# Patient Record
Sex: MALE | Race: WHITE | NOT HISPANIC OR LATINO | Employment: FULL TIME | URBAN - NONMETROPOLITAN AREA
[De-identification: names, ages, dates, MRNs, and addresses within clinical notes are randomized per-mention and may not be internally consistent; named-entity substitution may affect disease eponyms.]

---

## 2018-03-13 ENCOUNTER — OFFICE VISIT (OUTPATIENT)
Dept: INTERNAL MEDICINE | Facility: CLINIC | Age: 23
End: 2018-03-13

## 2018-03-13 VITALS
DIASTOLIC BLOOD PRESSURE: 70 MMHG | BODY MASS INDEX: 26.63 KG/M2 | TEMPERATURE: 97.4 F | HEIGHT: 71 IN | RESPIRATION RATE: 12 BRPM | HEART RATE: 94 BPM | SYSTOLIC BLOOD PRESSURE: 112 MMHG | WEIGHT: 190.2 LBS | OXYGEN SATURATION: 98 %

## 2018-03-13 DIAGNOSIS — R12 HEARTBURN: ICD-10-CM

## 2018-03-13 DIAGNOSIS — R19.7 WATERY DIARRHEA: Primary | ICD-10-CM

## 2018-03-13 PROCEDURE — 99213 OFFICE O/P EST LOW 20 MIN: CPT | Performed by: FAMILY MEDICINE

## 2018-03-13 RX ORDER — LOPERAMIDE HYDROCHLORIDE 2 MG/1
2 CAPSULE ORAL 4 TIMES DAILY PRN
COMMUNITY
End: 2019-03-12

## 2018-03-13 RX ORDER — RANITIDINE 150 MG/1
150 CAPSULE ORAL 2 TIMES DAILY PRN
Qty: 60 CAPSULE | Refills: 1 | Status: SHIPPED | OUTPATIENT
Start: 2018-03-13 | End: 2019-03-12

## 2018-03-13 NOTE — PROGRESS NOTES
"C/O nausea, decrease appetite, diarrhea, heartburn past 5 days.     SUBJECTIVE: Prabhjot Peterson is a 22 y.o. male seen for a follow up visit;    Gastroenteritis  Patient complains of cramping pain located in in the LLQ, diarrhea several times per day, heartburn and nausea for several days.  Patient denies fever. Patient's oral intake has been increased for liquids.  Patient's urine output has been adequate. Other contacts with similar symptoms include: no one. Patient denies recent travel history. Patient has not had recent ingestion of possible contaminated food, toxic plants, or inappropriate medications/poisons.      Started w/ LLQ abdominal pain last week, w/ diarrhea a few days later - bad for several days.  Imodium helped but still getting a lot of cramping and abdominal pain.  Diarrhea has been very watery, loose.  No one else in family       The following portions of the patient's history were reviewed and updated as appropriate: He  has no past medical history on file.  He  does not have a problem list on file.  He  has no past surgical history on file.  His family history includes Bipolar disorder in his father and mother; Breast cancer in his paternal grandmother; Diabetes in his brother and father; Heart disease in his maternal grandmother; Thyroid cancer in his mother.  He  reports that he has never smoked. He has never used smokeless tobacco. He reports that he drinks alcohol. He reports that he does not use drugs.  He has a current medication list which includes the following prescription(s): loperamide and ranitidine..    Review of Systems   Constitutional: Negative for chills and fever.   Respiratory: Negative.    Cardiovascular: Negative.    Gastrointestinal: Positive for abdominal pain, diarrhea and nausea. Negative for anal bleeding, blood in stool and vomiting.   Skin: Negative.          OBJECTIVE:  /70   Pulse 94   Temp 97.4 °F (36.3 °C) (Temporal Artery )   Resp 12   Ht 180.3 cm (71\")  "  Wt 86.3 kg (190 lb 3.2 oz)   SpO2 98%   BMI 26.53 kg/m²      Physical Exam   Constitutional: He appears well-developed and well-nourished. No distress.   Abdominal: Soft. There is tenderness in the left lower quadrant. There is no rigidity, no rebound, no guarding and no CVA tenderness.           ASSESSMENT:   Diagnosis Plan   1. Watery diarrhea  Cryptosporidium Antigen, Stool - Stool, Per Rectum    Fecal Leukocytes - Stool, Per Rectum    Shiga-like Toxin Antigen, EIA - Stool, Per Rectum    Campylobacter Culture, Stool - Stool, Per Rectum    Helicobacter Pylori, IgA IgG IgM    Clostridium Difficile EIA - Stool, Per Rectum    Stool Culture With Yersinia - Stool, Per Rectum    Comprehensive Metabolic Panel   2. Heartburn  Comprehensive Metabolic Panel    ranitidine (ZANTAC) 150 MG capsule                 I, Lynsey Solano MD, hereby attest that the medical record entry above accurately reflects signatures/notations that I made in my capacity as Lynsey Solano MD when I treated and diagnosed the above patient.  I do hereby attest that his information is true, accurate, and complete to the best of my knowledge and I understand that any falsification, omission, or concealment of material fact may subject me to administrative, civil, or criminal liability.

## 2019-03-12 ENCOUNTER — OFFICE VISIT (OUTPATIENT)
Dept: INTERNAL MEDICINE | Facility: CLINIC | Age: 24
End: 2019-03-12

## 2019-03-12 VITALS
SYSTOLIC BLOOD PRESSURE: 136 MMHG | HEART RATE: 102 BPM | HEIGHT: 71 IN | OXYGEN SATURATION: 98 % | WEIGHT: 188 LBS | BODY MASS INDEX: 26.32 KG/M2 | TEMPERATURE: 101 F | DIASTOLIC BLOOD PRESSURE: 72 MMHG

## 2019-03-12 DIAGNOSIS — J02.0 STREP PHARYNGITIS: Primary | ICD-10-CM

## 2019-03-12 DIAGNOSIS — R50.9 FEVER, UNSPECIFIED FEVER CAUSE: ICD-10-CM

## 2019-03-12 LAB
EXPIRATION DATE: ABNORMAL
EXPIRATION DATE: NORMAL
FLUAV RNA RESP QL NAA+PROBE: NEGATIVE
FLUBV RNA RESP QL NAA+PROBE: NEGATIVE
INTERNAL CONTROL: ABNORMAL
INTERNAL CONTROL: NORMAL
Lab: ABNORMAL
Lab: NORMAL
S PYO AG THROAT QL: POSITIVE

## 2019-03-12 PROCEDURE — 87502 INFLUENZA DNA AMP PROBE: CPT | Performed by: PHYSICIAN ASSISTANT

## 2019-03-12 PROCEDURE — 99213 OFFICE O/P EST LOW 20 MIN: CPT | Performed by: PHYSICIAN ASSISTANT

## 2019-03-12 PROCEDURE — 87880 STREP A ASSAY W/OPTIC: CPT | Performed by: PHYSICIAN ASSISTANT

## 2019-03-12 RX ORDER — AMOXICILLIN 875 MG/1
875 TABLET, COATED ORAL 2 TIMES DAILY
Qty: 20 TABLET | Refills: 0 | Status: SHIPPED | OUTPATIENT
Start: 2019-03-12 | End: 2019-03-22

## 2019-03-12 NOTE — PROGRESS NOTES
Chief Complaint   Patient presents with   • Sore Throat     body aches, fever, cough, ear drainage       Subjective   Prabhjot Peterson is a 23 y.o. male    History of Present Illness     Symptoms began Saturday with ear pressure.  This continued over Sunday and Monday.  Some mild associated post nasal drainage and cough.  He reports that this morning he began having body aches, sore throat, and chills.  He looked into his throat and his tonsils were swollen and he had white exudate to the back of his throat.  He has also had fever today.  He has taken Motrin, which did help relieve some of the sore throat.      History reviewed. No pertinent past medical history.  History reviewed. No pertinent surgical history.  Family History   Problem Relation Age of Onset   • Thyroid cancer Mother    • Bipolar disorder Mother    • Diabetes Father         type 1   • Bipolar disorder Father    • Diabetes Brother         type 1   • Heart disease Maternal Grandmother         CABG   • Breast cancer Paternal Grandmother      Social History     Socioeconomic History   • Marital status:      Spouse name: Not on file   • Number of children: Not on file   • Years of education: Not on file   • Highest education level: Not on file   Social Needs   • Financial resource strain: Not on file   • Food insecurity - worry: Not on file   • Food insecurity - inability: Not on file   • Transportation needs - medical: Not on file   • Transportation needs - non-medical: Not on file   Occupational History   • Not on file   Tobacco Use   • Smoking status: Never Smoker   • Smokeless tobacco: Never Used   Substance and Sexual Activity   • Alcohol use: Yes     Comment: Occasional 1 x month   • Drug use: No   • Sexual activity: Yes     Partners: Female   Other Topics Concern   • Not on file   Social History Narrative   • Not on file     No Known Allergies      Review of Systems   Constitutional: Positive for appetite change, chills, fatigue and fever.  Negative for activity change.   HENT: Positive for congestion, postnasal drip, sore throat and trouble swallowing. Negative for voice change.    Respiratory: Positive for cough. Negative for apnea and shortness of breath.    Cardiovascular: Negative for chest pain.   Musculoskeletal: Positive for arthralgias.   Skin: Negative.    Neurological: Positive for headache. Negative for dizziness and light-headedness.     Objective     Vitals:    03/12/19 1737   BP: 136/72   Pulse: 102   Temp: (!) 101 °F (38.3 °C)   SpO2: 98%       Physical Exam   Constitutional: He is oriented to person, place, and time. He appears well-developed and well-nourished. No distress.   HENT:   Head: Normocephalic and atraumatic.   Right Ear: Tympanic membrane, external ear and ear canal normal.   Left Ear: Tympanic membrane, external ear and ear canal normal.   Nose: Congestion present.   Mouth/Throat: Uvula is midline and mucous membranes are normal. Oropharyngeal exudate, posterior oropharyngeal edema and posterior oropharyngeal erythema present.   Eyes: Conjunctivae and EOM are normal. Pupils are equal, round, and reactive to light.   Neck: Normal range of motion. Neck supple.   Cardiovascular: Regular rhythm and normal heart sounds. Tachycardia present. Exam reveals no gallop and no friction rub.   No murmur heard.  Pulmonary/Chest: Effort normal and breath sounds normal. No respiratory distress. He has no wheezes. He has no rales.   Musculoskeletal: Normal range of motion. He exhibits no edema.   Lymphadenopathy:     He has cervical adenopathy.   Neurological: He is alert and oriented to person, place, and time.   Skin: Skin is warm and dry. Capillary refill takes less than 2 seconds. He is not diaphoretic.   Psychiatric: He has a normal mood and affect. His behavior is normal.   Nursing note and vitals reviewed.      Results for orders placed or performed in visit on 03/12/19   POCT Flu A&B, Molecular   Result Value Ref Range    POC  Influenza A, Molecular Negative Negative    POC Influenza B, Molecular Negative Negative    Internal Control Passed Passed    Lot Number [id number]     Expiration Date 04/30/2020    POC Rapid Strep A   Result Value Ref Range    Rapid Strep A Screen Positive (A) Negative, VALID, INVALID, Not Performed    Internal Control Passed Passed    Lot Number [id number]     Expiration Date 03/31/2020        Assessment/Plan     Prabhjot was seen today for sore throat.    Diagnoses and all orders for this visit:    Strep pharyngitis  -     Strep positive.  Start amoxicillin.  Use salt water gargles, sick warm fluids, throat lozenges for symptom control.  Return to clinic if symptoms are not improving or if they worsen.  Patient verbalizes understanding and is in agreement with plan.  -     amoxicillin (AMOXIL) 875 MG tablet; Take 1 tablet by mouth 2 (Two) Times a Day for 10 days.    Fever, unspecified fever cause  -     Strep positive, flu negative.  -     POCT Flu A&B, Molecular  -     POC Rapid Strep A        Return if symptoms worsen or fail to improve.    Ludmila Lowery PA-C

## 2019-03-20 ENCOUNTER — TELEPHONE (OUTPATIENT)
Dept: INTERNAL MEDICINE | Facility: CLINIC | Age: 24
End: 2019-03-20

## 2019-03-20 NOTE — TELEPHONE ENCOUNTER
Spoke with patient he has a few more days of amoxicillin left.  Continue amoxicillin use ibuprofen and salt water gargles.  He reports that a few days ago his symptoms had completely resolved but today had restarted with one side of his throat hurting.  Advised patient to call back Friday after amoxicillin was completed if symptoms are still persistent.  He agreed to plan.

## 2019-03-20 NOTE — TELEPHONE ENCOUNTER
Patient says that he still feels like he has strep wanting to know if there is anything else you can give him?

## 2019-03-22 RX ORDER — CEFDINIR 300 MG/1
300 CAPSULE ORAL 2 TIMES DAILY
Qty: 20 CAPSULE | Refills: 0 | Status: SHIPPED | OUTPATIENT
Start: 2019-03-22 | End: 2019-04-01

## 2019-03-22 NOTE — TELEPHONE ENCOUNTER
Pt called today stating he has only one pill left of amoxicillin and he has a fever the last two days and he feels like the strep has flared back up and moved from right tonsil to the left.

## 2019-03-27 ENCOUNTER — TELEPHONE (OUTPATIENT)
Dept: INTERNAL MEDICINE | Facility: CLINIC | Age: 24
End: 2019-03-27

## 2019-06-19 ENCOUNTER — OFFICE VISIT (OUTPATIENT)
Dept: INTERNAL MEDICINE | Facility: CLINIC | Age: 24
End: 2019-06-19

## 2019-06-19 VITALS
RESPIRATION RATE: 15 BRPM | BODY MASS INDEX: 25.9 KG/M2 | OXYGEN SATURATION: 99 % | DIASTOLIC BLOOD PRESSURE: 71 MMHG | SYSTOLIC BLOOD PRESSURE: 121 MMHG | HEART RATE: 93 BPM | HEIGHT: 71 IN | TEMPERATURE: 98.5 F | WEIGHT: 185 LBS

## 2019-06-19 DIAGNOSIS — R50.9 FEVER, UNSPECIFIED FEVER CAUSE: Primary | ICD-10-CM

## 2019-06-19 DIAGNOSIS — J02.0 STREP PHARYNGITIS: ICD-10-CM

## 2019-06-19 LAB
EXPIRATION DATE: ABNORMAL
INTERNAL CONTROL: ABNORMAL
Lab: ABNORMAL
S PYO RRNA THROAT QL PROBE: POSITIVE

## 2019-06-19 PROCEDURE — 87651 STREP A DNA AMP PROBE: CPT | Performed by: NURSE PRACTITIONER

## 2019-06-19 PROCEDURE — 99213 OFFICE O/P EST LOW 20 MIN: CPT | Performed by: NURSE PRACTITIONER

## 2019-06-19 PROCEDURE — 96372 THER/PROPH/DIAG INJ SC/IM: CPT | Performed by: NURSE PRACTITIONER

## 2019-06-19 NOTE — PROGRESS NOTES
Chief Complaint / Reason:      Chief Complaint   Patient presents with   • Sore Throat   • Sinusitis   • Fever       Subjective     HPI  Present Today with complaint of sore throat sinus pressure and fever.  He denies chest pain, shortness of breath or heart palpitations vital signs are stable.  He states symptoms have been going on for several days and has progressively worsened.  Patient has had strep in the past and states he feels like he could have strep again.  He has been getting child and does not want to make his wife or child sick if he does have strep.  He states he does not bite on his nails but does put his fingers in his mouth.  He states it is almost like oral fixation and he has been he was young.  History taken from: patient    PMH/FH/Social History were reviewed and updated appropriately in the electronic medical record.   History reviewed. No pertinent past medical history.  History reviewed. No pertinent surgical history.  Social History     Socioeconomic History   • Marital status:      Spouse name: Not on file   • Number of children: Not on file   • Years of education: Not on file   • Highest education level: Not on file   Tobacco Use   • Smoking status: Never Smoker   • Smokeless tobacco: Never Used   Substance and Sexual Activity   • Alcohol use: Yes     Comment: Occasional 1 x month   • Drug use: No   • Sexual activity: Yes     Partners: Female     Family History   Problem Relation Age of Onset   • Thyroid cancer Mother    • Bipolar disorder Mother    • Diabetes Father         type 1   • Bipolar disorder Father    • Diabetes Brother         type 1   • Heart disease Maternal Grandmother         CABG   • Breast cancer Paternal Grandmother        Review of Systems:   Review of Systems   Constitutional: Positive for fatigue and fever.   HENT: Positive for congestion, sore throat and trouble swallowing. Negative for voice change.    Respiratory: Negative.  Negative for apnea and  shortness of breath.    Cardiovascular: Negative.    Gastrointestinal: Negative.    Skin: Negative.    Neurological: Positive for headache.         All other systems were reviewed and are negative.  Exceptions are noted in the subjective or above.      Objective     Vital Signs  Vitals:    06/19/19 1147   BP: 121/71   Pulse: 93   Resp: 15   Temp: 98.5 °F (36.9 °C)   SpO2: 99%       Body mass index is 25.8 kg/m².    Physical Exam   Constitutional: He appears well-developed and well-nourished. No distress.   HENT:   Right Ear: External ear and ear canal normal. Tympanic membrane is erythematous and bulging.   Left Ear: External ear and ear canal normal. Tympanic membrane is erythematous and bulging.   Nose: Mucosal edema and rhinorrhea present. Right sinus exhibits no maxillary sinus tenderness and no frontal sinus tenderness. Left sinus exhibits no maxillary sinus tenderness and no frontal sinus tenderness.   Mouth/Throat: Mucous membranes are dry. Posterior oropharyngeal edema and posterior oropharyngeal erythema present. No oropharyngeal exudate or tonsillar abscesses. No tonsillar exudate.   Eyes: Conjunctivae are normal.   Cardiovascular: Normal rate, regular rhythm and normal heart sounds.   Pulmonary/Chest: Effort normal and breath sounds normal.   Abdominal: Soft. There is no hepatosplenomegaly. There is no tenderness.   Lymphadenopathy:        Head (right side): No submental, no submandibular and no tonsillar adenopathy present.        Head (left side): No submental, no submandibular and no tonsillar adenopathy present.     He has no cervical adenopathy.   Neurological: He is alert.   Skin: He is not diaphoretic.   Nursing note and vitals reviewed.           Results Review:    I reviewed the patient's new clinical results.   Office Visit on 06/19/2019   Component Date Value Ref Range Status   • POC Strep A, Molecular 06/19/2019 Positive* Negative Final   • Internal Control 06/19/2019 Passed  Passed Final   •  Lot Number 06/19/2019 [id number]   Final   • Expiration Date 06/19/2019 03/31/2020   Final         Medication Review:   No current outpatient medications on file.    Assessment/Plan   Prabhjot was seen today for sore throat, sinusitis and fever.    Diagnoses and all orders for this visit:    Fever, unspecified fever cause  -     POCT Strep A, molecular    Strep pharyngitis  -     penicillin G benzathine (BICILLIN-LA) injection 1.2 Million Units    Strep prevention discussed recommend patient take Tylenol or motrin  And discussed oral hygiene advised patient to avoid putting hand-foot-and-mouth.  Recommend patient change out toothbrush and contact office if symptoms worsen.  Return if symptoms worsen or fail to improve.    Lakeisha Toussaint, APRN  06/19/2019

## 2020-07-16 PROCEDURE — U0003 INFECTIOUS AGENT DETECTION BY NUCLEIC ACID (DNA OR RNA); SEVERE ACUTE RESPIRATORY SYNDROME CORONAVIRUS 2 (SARS-COV-2) (CORONAVIRUS DISEASE [COVID-19]), AMPLIFIED PROBE TECHNIQUE, MAKING USE OF HIGH THROUGHPUT TECHNOLOGIES AS DESCRIBED BY CMS-2020-01-R: HCPCS | Performed by: FAMILY MEDICINE

## 2020-07-18 ENCOUNTER — TELEPHONE (OUTPATIENT)
Dept: URGENT CARE | Facility: CLINIC | Age: 25
End: 2020-07-18

## 2020-07-24 ENCOUNTER — OFFICE VISIT (OUTPATIENT)
Dept: INTERNAL MEDICINE | Facility: CLINIC | Age: 25
End: 2020-07-24

## 2020-07-24 VITALS
RESPIRATION RATE: 15 BRPM | BODY MASS INDEX: 27.16 KG/M2 | HEART RATE: 90 BPM | WEIGHT: 194 LBS | HEIGHT: 71 IN | TEMPERATURE: 98.1 F | DIASTOLIC BLOOD PRESSURE: 66 MMHG | SYSTOLIC BLOOD PRESSURE: 124 MMHG | OXYGEN SATURATION: 99 %

## 2020-07-24 DIAGNOSIS — Z13.21 SCREENING FOR ENDOCRINE, NUTRITIONAL, METABOLIC AND IMMUNITY DISORDER: ICD-10-CM

## 2020-07-24 DIAGNOSIS — Z00.00 PHYSICAL EXAM, ANNUAL: Primary | ICD-10-CM

## 2020-07-24 DIAGNOSIS — Z13.29 SCREENING FOR ENDOCRINE, NUTRITIONAL, METABOLIC AND IMMUNITY DISORDER: ICD-10-CM

## 2020-07-24 DIAGNOSIS — Z13.228 SCREENING FOR ENDOCRINE, NUTRITIONAL, METABOLIC AND IMMUNITY DISORDER: ICD-10-CM

## 2020-07-24 DIAGNOSIS — Z83.3 FAMILY HISTORY OF DIABETES MELLITUS (DM): ICD-10-CM

## 2020-07-24 DIAGNOSIS — K62.5 BRIGHT RED RECTAL BLEEDING: ICD-10-CM

## 2020-07-24 DIAGNOSIS — Z13.0 SCREENING FOR ENDOCRINE, NUTRITIONAL, METABOLIC AND IMMUNITY DISORDER: ICD-10-CM

## 2020-07-24 DIAGNOSIS — K64.9 HEMORRHOIDS, UNSPECIFIED HEMORRHOID TYPE: ICD-10-CM

## 2020-07-24 LAB
ALBUMIN SERPL-MCNC: 5.1 G/DL (ref 3.5–5.2)
ALBUMIN/GLOB SERPL: 2.3 G/DL
ALP SERPL-CCNC: 83 U/L (ref 39–117)
ALT SERPL-CCNC: 19 U/L (ref 1–41)
AST SERPL-CCNC: 16 U/L (ref 1–40)
BASOPHILS # BLD AUTO: 0.05 10*3/MM3 (ref 0–0.2)
BASOPHILS NFR BLD AUTO: 0.9 % (ref 0–1.5)
BILIRUB SERPL-MCNC: 1.2 MG/DL (ref 0–1.2)
BUN SERPL-MCNC: 11 MG/DL (ref 6–20)
BUN/CREAT SERPL: 10.6 (ref 7–25)
CALCIUM SERPL-MCNC: 10.2 MG/DL (ref 8.6–10.5)
CHLORIDE SERPL-SCNC: 101 MMOL/L (ref 98–107)
CHOLEST SERPL-MCNC: 214 MG/DL (ref 0–200)
CO2 SERPL-SCNC: 29.1 MMOL/L (ref 22–29)
CREAT SERPL-MCNC: 1.04 MG/DL (ref 0.76–1.27)
EOSINOPHIL # BLD AUTO: 0.08 10*3/MM3 (ref 0–0.4)
EOSINOPHIL NFR BLD AUTO: 1.4 % (ref 0.3–6.2)
ERYTHROCYTE [DISTWIDTH] IN BLOOD BY AUTOMATED COUNT: 12.1 % (ref 12.3–15.4)
FERRITIN SERPL-MCNC: 96.8 NG/ML (ref 30–400)
GLOBULIN SER CALC-MCNC: 2.2 GM/DL
GLUCOSE SERPL-MCNC: 96 MG/DL (ref 65–99)
HBA1C MFR BLD: 4.9 % (ref 4.8–5.6)
HCT VFR BLD AUTO: 44.7 % (ref 37.5–51)
HDLC SERPL-MCNC: 49 MG/DL (ref 40–60)
HGB BLD-MCNC: 15.6 G/DL (ref 13–17.7)
IMM GRANULOCYTES # BLD AUTO: 0.03 10*3/MM3 (ref 0–0.05)
IMM GRANULOCYTES NFR BLD AUTO: 0.5 % (ref 0–0.5)
IRON SATN MFR SERPL: 33 % (ref 20–50)
IRON SERPL-MCNC: 128 MCG/DL (ref 59–158)
LDLC SERPL CALC-MCNC: 148 MG/DL (ref 0–100)
LYMPHOCYTES # BLD AUTO: 1.24 10*3/MM3 (ref 0.7–3.1)
LYMPHOCYTES NFR BLD AUTO: 21.4 % (ref 19.6–45.3)
MCH RBC QN AUTO: 28.7 PG (ref 26.6–33)
MCHC RBC AUTO-ENTMCNC: 34.9 G/DL (ref 31.5–35.7)
MCV RBC AUTO: 82.2 FL (ref 79–97)
MONOCYTES # BLD AUTO: 0.46 10*3/MM3 (ref 0.1–0.9)
MONOCYTES NFR BLD AUTO: 7.9 % (ref 5–12)
NEUTROPHILS # BLD AUTO: 3.94 10*3/MM3 (ref 1.7–7)
NEUTROPHILS NFR BLD AUTO: 67.9 % (ref 42.7–76)
NRBC BLD AUTO-RTO: 0 /100 WBC (ref 0–0.2)
PLATELET # BLD AUTO: 274 10*3/MM3 (ref 140–450)
POTASSIUM SERPL-SCNC: 4.1 MMOL/L (ref 3.5–5.2)
PROT SERPL-MCNC: 7.3 G/DL (ref 6–8.5)
RBC # BLD AUTO: 5.44 10*6/MM3 (ref 4.14–5.8)
SODIUM SERPL-SCNC: 140 MMOL/L (ref 136–145)
T4 FREE SERPL-MCNC: 1.34 NG/DL (ref 0.93–1.7)
TIBC SERPL-MCNC: 388 MCG/DL
TRIGL SERPL-MCNC: 85 MG/DL (ref 0–150)
TSH SERPL DL<=0.005 MIU/L-ACNC: 1.27 UIU/ML (ref 0.27–4.2)
UIBC SERPL-MCNC: 260 MCG/DL (ref 112–346)
VLDLC SERPL CALC-MCNC: 17 MG/DL
WBC # BLD AUTO: 5.8 10*3/MM3 (ref 3.4–10.8)

## 2020-07-24 PROCEDURE — 99395 PREV VISIT EST AGE 18-39: CPT | Performed by: NURSE PRACTITIONER

## 2020-07-24 NOTE — PROGRESS NOTES
Subjective   Prabhjot Peterson is a 24 y.o. male and is here for a comprehensive physical exam. The patient reports problems - hemmoroids occasional bleeding .  Has tried over-the-counter medications with minimal relief.  Patient states that the blood is bright red and he has had it for some time he states that he does have a sedentary lifestyle as he did change jobs and has been doing a [employer] job in [place].  He denies fever chills weight loss or family history of colon cancer.  Patient does have a family history of diabetes.    Do you take any herbs or supplements that were not prescribed by a doctor?yes melatonin  Are you taking calcium supplements? No  Are you taking aspirin daily? No  Exercise:5 walking   Vision UTD:Yes  Dental UTD:Yes   History:  Patient receives prostate care here: N/A  He reports No decrease in urinary stream,  No nocturia, No dribbling, No hesitancy.    STDs:No  Sexually active at age:17  Abuse:No  Checks testicles:Yes   and has 1 child   The following portions of the patient's history were reviewed and updated as appropriate: allergies, current medications, past family history, past medical history, past social history, past surgical history and problem list.    Review of Systems  Do you have pain that bothers you in your daily life? no  Review of Systems   Constitutional: Negative for chills, fatigue, fever, unexpected weight gain and unexpected weight loss.   HENT: Negative for congestion, ear pain, hearing loss, rhinorrhea, sinus pressure, sneezing, sore throat and trouble swallowing.    Eyes: Negative for discharge and itching.   Respiratory: Negative for cough, chest tightness and shortness of breath.    Cardiovascular: Negative for chest pain, palpitations and leg swelling.   Gastrointestinal: Positive for anal bleeding. Negative for abdominal pain, blood in stool, constipation, diarrhea and vomiting.   Endocrine: Negative for polydipsia and polyuria.   Genitourinary:  "Negative for difficulty urinating, dysuria, frequency, hematuria, urgency and urinary incontinence.   Musculoskeletal: Negative for arthralgias, back pain, gait problem and joint swelling.   Skin: Negative for rash and bruise.   Allergic/Immunologic: Positive for environmental allergies. Negative for immunocompromised state.   Neurological: Negative for dizziness, syncope, weakness, light-headedness, numbness and headache.   Hematological: Does not bruise/bleed easily.   Psychiatric/Behavioral: Negative for behavioral problems, dysphoric mood and sleep disturbance. The patient is not nervous/anxious.          Objective   /66   Pulse 90   Temp 98.1 °F (36.7 °C)   Resp 15   Ht 180.3 cm (71\")   Wt 88 kg (194 lb)   SpO2 99%   BMI 27.06 kg/m²     Physical Exam   Constitutional: He is oriented to person, place, and time. He appears well-developed and well-nourished.   HENT:   Head: Normocephalic and atraumatic.   Right Ear: External ear and ear canal normal. Tympanic membrane is erythematous and bulging. No decreased hearing is noted.   Left Ear: External ear and ear canal normal. Tympanic membrane is bulging. No decreased hearing is noted.   Nose: Mucosal edema present.   Mouth/Throat: Oropharynx is clear and moist and mucous membranes are normal. Normal dentition.   Eyes: Pupils are equal, round, and reactive to light. Conjunctivae, EOM and lids are normal.   Neck: Trachea normal, normal range of motion and full passive range of motion without pain. Neck supple. No JVD present. Carotid bruit is not present. Thyroid mass present. No thyromegaly present.       Cardiovascular: Normal rate, regular rhythm, S1 normal, S2 normal, normal heart sounds and intact distal pulses.   No murmur heard.  Pulmonary/Chest: Effort normal and breath sounds normal.   Abdominal: Soft. Bowel sounds are normal. He exhibits no abdominal bruit and no mass. There is no hepatosplenomegaly. There is no tenderness.   Genitourinary: "   Genitourinary Comments: Deferred and refused rectal exam    Musculoskeletal: Normal range of motion. He exhibits no edema.   Lymphadenopathy:     He has no cervical adenopathy.        Right: No inguinal and no supraclavicular adenopathy present.        Left: No inguinal and no supraclavicular adenopathy present.   Neurological: He is alert and oriented to person, place, and time. He has normal strength. He displays normal reflexes. No cranial nerve deficit or sensory deficit. Gait normal.   Skin: Skin is warm and dry. Capillary refill takes less than 2 seconds. No rash noted. No cyanosis. Nails show no clubbing.   Psychiatric: He has a normal mood and affect. His speech is normal and behavior is normal. Judgment and thought content normal.   Nursing note and vitals reviewed.        Assessment/Plan   Healthy male exam.      1.Prabhjot was seen today for annual exam.    Diagnoses and all orders for this visit:    Physical exam, annual  -     Comprehensive metabolic panel  -     Lipid panel  -     CBC w AUTO Differential    Screening for endocrine, nutritional, metabolic and immunity disorder  -     TSH  -     T4, free  -     Iron and TIBC  -     Ferritin  -     Hemoglobin A1c    Family history of diabetes mellitus (DM)  -     Hemoglobin A1c    Bright red rectal bleeding  -     CBC w AUTO Differential    Hemorrhoids, unspecified hemorrhoid type  -     Hydrocort-Pramoxine, Perianal, (PROCTOFOAM-HS) 1-1 % rectal foam; Insert 1 applicator into the rectum 2 (Two) Times a Day.    Recommend increasing fiber in diet along with hydration nutrition.  Advised patient to avoid straining.  Discussed [place] stool chart and recommend GI referral if symptoms worsen    2. Patient Counseling:  --Nutrition: Stressed importance of moderation in sodium/caffeine intake, saturated fat and cholesterol, caloric balance, sufficient intake of fresh fruits, vegetables, fiber, calcium, iron,   --Discussed the issue of calcium supplement, and the  daily use of baby aspirin if applicable.  --Exercise: Stressed the importance of regular exercise.   --Substance Abuse: Discussed cessation/primary prevention of tobacco (if applicable, alcohol, or other drug use (if applicable); driving or other dangerous activities under the influence; availability of treatment for abuse.    --Sexuality: Discussed sexually transmitted diseases, partner selection, use of condoms, avoidance of unintended pregnancy  and contraceptive alternatives.   --Injury prevention: Discussed safety belts, safety helmets, smoke detector, smoking near bedding or upholstery.   --Dental health: Discussed importance of regular tooth brushing, flossing, and dental visits.  --Immunizations reviewed.  --Discussed benefits of screening vision/dental   --After hours service discussed with patient    3. Discussed the patient's BMI with him.  The BMI is in the acceptable range  4. Follow up in one year and prsonia Toussaint, APRN 07/24/2020

## 2020-07-27 ENCOUNTER — TELEPHONE (OUTPATIENT)
Dept: INTERNAL MEDICINE | Facility: CLINIC | Age: 25
End: 2020-07-27

## 2020-07-27 NOTE — TELEPHONE ENCOUNTER
PT CALLED STATED THAT HIS TEST RESULTS ARE IN AND WOULD LIKE LIKE SOMEONE TO REACH OUT TO HIM TO DISCUSS RESULTS.    PLEASE ADVISE.  CALL BACK:[phone]

## 2022-05-09 ENCOUNTER — OFFICE VISIT (OUTPATIENT)
Dept: UROLOGY | Facility: CLINIC | Age: 27
End: 2022-05-09

## 2022-05-09 VITALS
BODY MASS INDEX: 27.16 KG/M2 | WEIGHT: 194 LBS | SYSTOLIC BLOOD PRESSURE: 126 MMHG | TEMPERATURE: 97.8 F | OXYGEN SATURATION: 99 % | DIASTOLIC BLOOD PRESSURE: 74 MMHG | HEART RATE: 91 BPM | HEIGHT: 71 IN

## 2022-05-09 DIAGNOSIS — Z30.09 VASECTOMY EVALUATION: Primary | ICD-10-CM

## 2022-05-09 PROCEDURE — 99203 OFFICE O/P NEW LOW 30 MIN: CPT | Performed by: UROLOGY

## 2022-05-09 NOTE — PROGRESS NOTES
"Chief Complaint  Elective sterilization    Referring provider  Referring, Self    HPI  Mr. Peterson is a 26 y.o. male who presents with desire for irreversible, elective sterilization.    He has 2 kids.    His wife is supportive of this decision.      Past Medical History  History reviewed. No pertinent past medical history.    Past Surgical History  Past Surgical History:   Procedure Laterality Date   • NASAL SEPTUM SURGERY         Medications    Current Outpatient Medications:   •  Hydrocort-Pramoxine, Perianal, (PROCTOFOAM-HS) 1-1 % rectal foam, Insert 1 applicator into the rectum 2 (Two) Times a Day., Disp: 10 g, Rfl: 5    Allergies  No Known Allergies    Social History  Social History     Socioeconomic History   • Marital status:    Tobacco Use   • Smoking status: Never Smoker   • Smokeless tobacco: Never Used   Substance and Sexual Activity   • Alcohol use: Yes     Comment: Occasional 1 x month   • Drug use: No   • Sexual activity: Yes     Partners: Female       Family History  He has no family history of prostate cancer    Review of Systems  Constitutional: Negative for fever, chills, weight loss, weight gain and malaise/fatigue.   Skin: Negative for rash.   Eyes: Negative for blurred vision.   Endocrine: No heat/cold intolerance   Cardiovascular: Negative for chest pain or dyspnea on exertion.  Respiratory: Negative for shortness of breath or wheezing.   Gastrointestinal: Negative for nausea, abdominal pain, diarrhea, constipation.   Genitourinary: Negative for new lower urinary tract symptoms, current gross hematuria or dysuria.  Musculoskeletal: Negative for back pain and joint pain.   Lymph/Heme: Negative for easily bruises / bleeds.     Physical Exam  Visit Vitals  /74   Pulse 91   Temp 97.8 °F (36.6 °C)   Ht 180.3 cm (71\")   Wt 88 kg (194 lb)   SpO2 99%   BMI 27.06 kg/m²     Constitutional: NAD, WDWN.   HEENT: NCAT. Conjunctivae normal.  MMM.    Cardiovascular: Regular rate.  Pulmonary/Chest: " Respirations are even and non-labored bilaterally.  Abdominal: Soft. No distension, tenderness, masses or guarding. No CVA tenderness.  Neurological: A + O x 3.  Cranial Nerves II-XII grossly intact. Normal gait.  Extremities: ENIO x 4, Warm. No clubbing.  No cyanosis.    Skin: Pink, warm and dry.  No rashes noted.  Psychiatric:  Normal mood and affect    Genitourinary  Penis: un circumcised penis, glans normal, no penile discharge.  No rashes/lesions.    Testes: descended bilaterally, no masses, nontender to palpation. Remainder of scrotal contents normal. No hernia appreciated.  Bilateral vasa palpable    Assessment and Plan  Mr. Peterson is a 26 y.o. male who presents today requesting permanent, irreversible surgical sterilization.  He was instructed to trim his pubic hair the night prior with a clippers.  The vasectomy was discussed in detail with the patient today including the risks which are failure of the procedure, infection, bleeding, development of chronic testicular pain and the possibility of injuring adjacent structures which could potentially result in loss of the testicle.  Furthermore, the patient was told he would remain fertile following the procedure until he provided a semen analysis that showed no sperm.  The patient expressed understanding and desire to proceed.      He will be scheduled for vasectomy with nitrous at his convenience. He was given a prescription for diclofenac to take 1 hr beforehand.     I spent a total of 30 minutes in total patient care, which involved direct interaction, examination, chart review, and discussion of treatment options.

## 2022-07-29 ENCOUNTER — PROCEDURE VISIT (OUTPATIENT)
Dept: UROLOGY | Facility: CLINIC | Age: 27
End: 2022-07-29

## 2022-07-29 DIAGNOSIS — Z30.2 ENCOUNTER FOR VASECTOMY: Primary | ICD-10-CM

## 2022-07-29 PROCEDURE — 55250 REMOVAL OF SPERM DUCT(S): CPT | Performed by: UROLOGY

## 2022-07-29 NOTE — PATIENT INSTRUCTIONS
Home Care after Vasectomy   Follow these guidelines for your care after your surgery to help your recovery.    Activity   Limit your activity for the first 5 days after surgery to light activity.   You may return to work in a day or so.   Limit lifting, pushing or pulling to less than 20 pounds for the next week. Also limit running and long walks.     Swelling  Scrotal swelling from the surgery may take weeks to get better. You should call your doctor if the swelling is severe and the scrotum is larger than an orange.   Use ice packs to the scrotum and penis for 15 minutes every hour for the first 48 hours when you are awake to limit swelling. Use a plastic bag with ice or a bag of frozen peas for the ice pack. Wrap a cloth or towel around the ice pack so the ice does not directly touch your skin.   Wear a jock strap or tight underwear for the next week to support your scrotum and reduce swelling.    Incision care   Stitches will dissolve and do not need to be removed.    Expect a small amount of blood may stain the dressings for up to 72 hours after surgery.    For the first few days, apply two or three gauze pads to the site each day and as needed to keep the dressing dry. This will protect the incision and help keep your clothes clean.   When you are no longer having any drainage, stop using the gauze pads over the site.    Bathing or showering   You may shower 48 to 72 hours after surgery.  Allow the water to wash over the incision but do not scrub the incision. Dry the site gently by patting it with a clean towel.    Tub baths should be avoided for 7 days after surgery.    Swimming should be avoided for 14 days after surgery.      Pain control  You will likely be sent home with a prescription for a few days of pain medicine. .  After 48 hours, most patients can take extra strength Tylenol (acetaminophen) or Advil (ibuprofen) for pain, following the label directions. Pain most often is eased after 5 to 7  days.  Take the pain medication 2x per day for the first 3 days, scheduled.     Sexual activity  You need to avoid ejaculating for 3 days after surgery.    Follow up  You should have a semen analysis performed in 8-12 weeks after your procedure to confirm sterility.  Use contraceptives until this is confirmed to prevent pregnancy.    When to call your doctor   Severe swelling, larger than the size of an orange    A large amount of fluid drainage that soaks several pads per day   Pain that is not controlled with pain medicine and use of ice packs   Any signs of infection such as:        Increased redness or tenderness around the incision site        Pus type drainage from the incision        Fever of greater than 101 degrees F    Other Contacts  Urology Office:  [id number]   [place] [id number] [phone] office  [phone] fax

## 2022-07-29 NOTE — PROGRESS NOTES
Preoperative diagnosis  Elective sterilization    Postoperative diagnosis  Elective sterilization    Procedure performed  Bilateral vasectomy    Surgeon  Greg Castellon MD    Anesthesia  5 mL lidocaine 2% local injection  50% nitrous oxide /oxygen mixture    Complications  None    EBL  2 mL    Specimen  Left vas  Right vas    Indications  26 y.o. male agreed to undergo the above named procedure after discussion of the alternatives, risks and benefits.  Informed consent was obtained.      Procedure  The patient was brought to the procedure room and placed in supine position.  His scrotum was prepped with Hibiclens and he was draped in a sterile fashion.  A timeout was performed.  The patient was given inhaled self-administered nitrous oxide for anesthesia.    Lidocaine 2% was used to anesthetize the scrotal skin as well as perivasal sheaths using a high-pressure jet injector. A 1 cm skin incision was created with the sharp-tip mosquito and a vas clamp utilized through this incision to grasp the vas.  The left vas was elevated to the skin surface and dissected free of its perivasal sheath.  The vas was transected and the lumen was cauterized both proximally and distally.  A 4-0 chromic suture was utilized to place the proximal vasal portion under the perivasal sheath, such that the 2 ends were divided by the perivasal sheath (fascial interposition).  This portion of the vas was then allowed to drop back into the left hemiscrotum.  The right side was treated next in the same manner as described above.  The skin incision was closed with the 4-0 chromic suture.  The patient tolerated the procedure well.    It was reiterated to the patient that he would remain fertile for sometime and should present to the office for a post-vacectomy semen analysis to confirm sterility.  We will check a semen analysis in approximately 2 months. The patient expressed understanding.

## 2022-08-01 LAB — REF LAB TEST METHOD: NORMAL

## 2022-08-05 ENCOUNTER — TELEPHONE (OUTPATIENT)
Dept: UROLOGY | Facility: CLINIC | Age: 27
End: 2022-08-05

## 2022-08-05 NOTE — TELEPHONE ENCOUNTER
Pt called one week post op Vasectomy and expressed concerns about a tinge of blood in semen. Patient has had three ejaculations after 5 days and the blood has increased to 15% (per patient) of blood. Patient denied fever, redness, hot to touch, oozing. I let patient know this is normal for bloody semen up to 6 months post op.